# Patient Record
Sex: FEMALE | Race: BLACK OR AFRICAN AMERICAN | Employment: PART TIME | URBAN - METROPOLITAN AREA
[De-identification: names, ages, dates, MRNs, and addresses within clinical notes are randomized per-mention and may not be internally consistent; named-entity substitution may affect disease eponyms.]

---

## 2021-09-07 ENCOUNTER — VIRTUAL VISIT (OUTPATIENT)
Dept: FAMILY MEDICINE CLINIC | Age: 46
End: 2021-09-07
Payer: COMMERCIAL

## 2021-09-07 DIAGNOSIS — R51.9 ACUTE INTRACTABLE HEADACHE, UNSPECIFIED HEADACHE TYPE: ICD-10-CM

## 2021-09-07 DIAGNOSIS — R11.2 NAUSEA AND VOMITING, INTRACTABILITY OF VOMITING NOT SPECIFIED, UNSPECIFIED VOMITING TYPE: ICD-10-CM

## 2021-09-07 DIAGNOSIS — U07.1 COVID-19: Primary | ICD-10-CM

## 2021-09-07 DIAGNOSIS — J10.1 INFLUENZA A: ICD-10-CM

## 2021-09-07 LAB
INFLUENZA A ANTIBODY: ABNORMAL
INFLUENZA B ANTIBODY: ABNORMAL
Lab: ABNORMAL
PERFORMING INSTRUMENT: ABNORMAL
QC PASS/FAIL: ABNORMAL
SARS-COV-2, POC: DETECTED

## 2021-09-07 PROCEDURE — 87426 SARSCOV CORONAVIRUS AG IA: CPT | Performed by: NURSE PRACTITIONER

## 2021-09-07 PROCEDURE — 99213 OFFICE O/P EST LOW 20 MIN: CPT | Performed by: NURSE PRACTITIONER

## 2021-09-07 PROCEDURE — 87804 INFLUENZA ASSAY W/OPTIC: CPT | Performed by: NURSE PRACTITIONER

## 2021-09-07 RX ORDER — ALBUTEROL SULFATE 90 UG/1
2 AEROSOL, METERED RESPIRATORY (INHALATION) EVERY 4 HOURS PRN
COMMUNITY
Start: 2021-07-12

## 2021-09-07 RX ORDER — ONDANSETRON 4 MG/1
4 TABLET, ORALLY DISINTEGRATING ORAL EVERY 8 HOURS PRN
Qty: 21 TABLET | Refills: 0 | Status: SHIPPED | OUTPATIENT
Start: 2021-09-07

## 2021-09-07 RX ORDER — OSELTAMIVIR PHOSPHATE 75 MG/1
75 CAPSULE ORAL 2 TIMES DAILY
Qty: 10 CAPSULE | Refills: 0 | Status: SHIPPED | OUTPATIENT
Start: 2021-09-07 | End: 2021-09-12

## 2021-09-07 RX ORDER — BUTALBITAL, ACETAMINOPHEN AND CAFFEINE 50; 325; 40 MG/1; MG/1; MG/1
1 TABLET ORAL EVERY 4 HOURS PRN
Qty: 180 TABLET | Refills: 3 | Status: SHIPPED | OUTPATIENT
Start: 2021-09-07

## 2021-09-07 ASSESSMENT — ENCOUNTER SYMPTOMS
WHEEZING: 0
COUGH: 0
SHORTNESS OF BREATH: 0
NAUSEA: 1
RHINORRHEA: 0
SINUS PAIN: 1
SINUS PRESSURE: 1
SORE THROAT: 0
VOMITING: 1

## 2021-09-07 NOTE — PATIENT INSTRUCTIONS
Patient Education        Learning About Coronavirus [phone])  What is coronavirus (COVID-19)? COVID-19 is a disease caused by a new type of coronavirus. This illness was first found in December 2019. It has since spread worldwide. Coronaviruses are a large group of viruses. They cause the common cold. They also cause more serious illnesses like [place] respiratory syndrome (MERS) and severe acute respiratory syndrome (SARS). COVID-19 is caused by a novel coronavirus. That means it's a new type that has not been seen in people before. What are the symptoms? Coronavirus (COVID-19) symptoms may include:  · Fever. · Cough. · Trouble breathing. · Chills or repeated shaking with chills. · Muscle pain. · Headache. · Sore throat. · New loss of taste or smell. · Vomiting. · Diarrhea. In severe cases, COVID-19 can cause pneumonia and make it hard to breathe without help from a machine. It can cause death. How is it diagnosed? COVID-19 is diagnosed with a viral test. This may also be called a PCR test or antigen test. It looks for evidence of the virus in your breathing passages or lungs (respiratory system). The test is most often done on a sample from the nose, throat, or lungs. It's sometimes done on a sample of saliva. One way a sample is collected is by putting a long swab into the back of your nose. How is it treated? Mild cases of COVID-19 can be treated at home. Serious cases need treatment in the hospital. Treatment may include medicines to reduce symptoms, plus breathing support such as oxygen therapy or a ventilator. Some people may be placed on their belly to help their oxygen levels. Treatments that may help people who have COVID-19 include:  Antiviral medicines. These medicines treat viral infections. Remdesivir is an example. Immune-based therapy. These medicines help the immune system fight COVID-19. One example is bamlanivimab. It's a monoclonal antibody. Blood thinners. These medicines help prevent blood clots. People with severe illness are at risk for blood clots. How can you protect yourself and others? The best way to protect yourself from getting sick is to:  · Avoid areas where there is an outbreak. · Avoid contact with people who may be infected. · Avoid crowds and try to stay at least 6 feet away from other people. · Wash your hands often, especially after you cough or sneeze. Use soap and water, and scrub for at least 20 seconds. If soap and water aren't available, use an alcohol-based hand . · Avoid touching your mouth, nose, and eyes. To help avoid spreading the virus to others:  · Stay home if you are sick or have been exposed to the virus. Don't go to school, work, or public areas. And don't use public transportation, ride-shares, or taxis unless you have no choice. · Wear a cloth face cover if you have to go to public areas. · Cover your mouth with a tissue when you cough or sneeze. Then throw the tissue in the trash and wash your hands right away. · If you're sick:  ? Leave your home only if you need to get medical care. But call the doctor's office first so they know you're coming. And wear a face cover. ? Wear the face cover whenever you're around other people. It can help stop the spread of the virus. ? Limit contact with pets and people in your home. If possible, stay in a separate bedroom and use a separate bathroom. ? Clean and disinfect your home every day. Use household  and disinfectant wipes or sprays. Take special care to clean things that you grab with your hands. These include doorknobs, remote controls, phones, and handles on your refrigerator and microwave. And don't forget countertops, tabletops, bathrooms, and computer keyboards. When should you call for help? Call 911 anytime you think you may need emergency care.  For example, call if you have life-threatening symptoms, such as:    · You have severe trouble breathing. (You can't talk at all.)     · You have constant chest pain or pressure.     · You are severely dizzy or lightheaded.     · You are confused or can't think clearly.     · Your face and lips have a blue color.     · You pass out (lose consciousness) or are very hard to wake up. Call your doctor now or seek immediate medical care if:    · You have moderate trouble breathing. (You can't speak a full sentence.)     · You are coughing up blood (more than about 1 teaspoon).     · You have signs of low blood pressure. These include feeling lightheaded; being too weak to stand; and having cold, pale, clammy skin. Watch closely for changes in your health, and be sure to contact your doctor if:    · Your symptoms get worse.     · You are not getting better as expected. Call before you go to the doctor's office. Follow their instructions. And wear a cloth face cover. Current as of: March 26, 2021               Content Version: 12.9  © 2006-2021 [employer]. Care instructions adapted under license by [address] [place] [address]. If you have questions about a medical condition or this instruction, always ask your healthcare professional. [address] any warranty or liability for your use of this information. Patient Education        10 Things to Do When You Have COVID-19    Stay home. Don't go to school, work, or public areas. And don't use public transportation, ride-shares, or taxis unless you have no choice. Leave your home only if you need to get medical care. But call the doctor's office first so they know you're coming. And wear a cloth face cover. Ask before leaving isolation. Talk with your doctor or other health professional about when it will be safe for you to leave isolation. Wear a cloth face cover when you are around other people. It can help stop the spread of the virus when you cough or sneeze. Limit contact with people in your home.   If possible, stay in a separate bedroom and use a separate bathroom. Avoid contact with pets and other animals. If possible, have a friend or family member care for them while you're sick. Cover your mouth and nose with a tissue when you cough or sneeze. Then throw the tissue in the trash right away. Wash your hands often, especially after you cough or sneeze. Use soap and water, and scrub for at least 20 seconds. If soap and water aren't available, use an alcohol-based hand . Don't share personal household items. These include bedding, towels, cups and glasses, and eating utensils. Clean and disinfect your home every day. Use household  or disinfectant wipes or sprays. Take special care to clean things that you grab with your hands. These include doorknobs, remote controls, phones, and handles on your refrigerator and microwave. And don't forget countertops, tabletops, bathrooms, and computer keyboards. Take acetaminophen (Tylenol) to relieve fever and body aches. Read and follow all instructions on the label. Current as of: March 26, 2021               Content Version: 12.9  © 2006-2021 Healthwise, [employer]. Care instructions adapted under license by [facility] ([facility]). If you have questions about a medical condition or this instruction, always ask your healthcare professional. [place] [address] any warranty or liability for your use of this information. Patient Education        Learning About Contact Tracing for COVID-19  What is contact tracing? Contact tracing is a process that public health departments use to fight the spread of infectious diseases. These include COVID-19 (coronavirus), measles, and others. A health department worker reaches out to a person who has tested positive for the disease. Then the worker calls other people who may have been exposed.  Depending on the disease, the contact may have happened through close contact, by eating at the same place, or through sex. The contacts are told that they have been exposed. The worker can then guide the contacts on what to do next. This may include seeing a doctor, getting tested, or staying quarantined at home for a while. Information about the person and their contacts is kept private. It's used only to help stop the spread of the disease. If you have any concerns about privacy, talk to the health department worker. Why is it done? Contact tracing helps reduce the risk of spreading COVID-19 among your friends, family, and community. A person who tests positive for COVID-19 can expose other people to the virus. Each of these people in turn can expose more people in an ever-widening [place]. This raises the risk of more people getting sick. But a call from the health department can help both the person and their contacts take action so they don't spread the virus to others. Then the risk of illness and death in the community goes down. How is it done? Contact tracing for COVID-19 usually starts with a phone call. A health department worker calls you to say that you've tested positive for COVID-19 or that you've been in close contact with someone who has. If you test positive for COVID-19  The caller will ask what symptoms, if any, you have. You may be asked about any health conditions that may put you at higher risk for serious illness. You'll be urged to stay home and self-isolate. How long you'll need to stay home depends on whether you have symptoms. If you don't have symptoms, it may be about 10 days from the date of your test result. If you have symptoms, ask the caller how long you'll have to self-isolate. The caller will ask where you've been recently. They'll ask for the names and phone numbers of anyone you've had close contact with. These people will also be contacted. And the caller will contact any businesses or event venues you visited.  Your name will not be given out unless you say it's okay. If your contacts get early warning that they may have COVID-19, they can self-isolate sooner. They may be less likely to pass COVID-19 to their own friends and family. If you are a close contact  If you were in close contact with someone who has COVID-19, the caller will urge you to stay home and self-quarantine. You may need to stay in quarantine for up to 14 days, starting on the day of the contact. Ask your doctor when it's safe to end your quarantine. Be sure to follow all instructions from your local health authorities. The caller will give you information about COVID-19 and urge you to watch for any symptoms. How long you stay home may change if you have symptoms. Follow the caller's instructions. You may be asked about other people you've come in contact with. Where can you learn more? Go to [email or web address] org and sign in to your [employer] account. Enter A132 in the [employer] box to learn more about \"Learning About Contact Tracing for COVID-19. \"     If you do not have an account, please click on the \"Sign Up Now\" link. Current as of: March 26, 2021               Content Version: 12.9  © 2006-2021 Healthwise[employer]. Care instructions adapted under license by [facility] ([facility]). If you have questions about a medical condition or this instruction, always ask your healthcare professional. [place] [address] any warranty or liability for your use of this information.

## 2021-09-07 NOTE — PROGRESS NOTES
TELEHEALTH EVALUATION -- Audio/Visual (During EDDDF-48 public health emergency)    -   Hermilo Ricks is a 55 y.o. female being evaluated by a Virtual Visit (video visit) encounter to address concerns as mentioned above. A caregiver was present when appropriate. Due to this being a TeleHealth encounter (During LBQHR-94 public health emergency), evaluation of the following organ systems was limited: Vitals/Constitutional/EENT/Resp/CV/GI//MS/Neuro/Skin/Heme-Lymph-Imm. Pursuant to the emergency declaration under the [facility], [address] [place] [address] authority and the [employer] Act, this Virtual Visit was conducted with patient's (and/or legal guardian's) consent, to reduce the patient's risk of exposure to COVID-19 and provide necessary medical care. The patient (and/or legal guardian) has also been advised to contact this office for worsening conditions or problems, and seek emergency medical treatment and/or call 911 if deemed necessary. Patient was contacted and agreed to proceed with a virtual visit via [email or web address]. me  The risks and benefits of converting to a virtual visit were discussed in light of the current infectious disease epidemic. Patient also understood that insurance coverage and co-pays are up to their individual insurance plans. Patient was located at their home. Provider was located at their office.      2021  Hermilo Ricks (:  1975) has requested an audio/video evaluation for the following concern(s):    HPI          Very fatigued starting last week like tues or wends  Slept 5pm -3 AM  Then Friday headache   Sat hard get rid of HA    HA   Yesterday bad-fever, vomiting   Today vomiting   Headache is terrible   Today cant smell or taste   Not too much of a cough   Not really stuffy or runny nose   Some sneezing   Temp max 101-tylenol brought it status: Current Every Day Smoker   Substance and Sexual Activity    Alcohol use: Not on file    Drug use: Not on file    Sexual activity: Not on file   Other Topics Concern    Not on file   Social History Narrative    Not on file     Social Determinants of Health     Financial Resource Strain:     Difficulty of Paying Living Expenses:    Food Insecurity:     Worried About Running Out of Food in the Last Year:     [address] in the Last Year:    Transportation Needs:     Lack of Transportation (Medical):  Lack of Transportation (Non-Medical):    Physical Activity:     Days of Exercise per Week:     Minutes of Exercise per Session:    Stress:     Feeling of Stress :    Social Connections:     Frequency of Communication with Friends and Family:     Frequency of Social Gatherings with Friends and Family:     Attends Muslim Services:     Active Member of Clubs or Organizations:     Attends Club or Organization Meetings:     Marital Status:    Intimate Partner Violence:     Fear of Current or Ex-Partner:     Emotionally Abused:     Physically Abused:     Sexually Abused:      No family history on file. Allergies   Allergen Reactions    Latex     Pcn [Penicillins]        PMH, Surgical Hx, Family Hx, and Social Hx reviewed and updated. Health Maintenance reviewed.     PHYSICAL EXAMINATION:  \"[x]\" Indicates a positive item  \"[]\" Indicates a negative item    Vital Signs: (As obtained by patient/caregiver or practitioner observation)    Blood pressure-  Heart rate-    Respiratory rate-    Temperature-  Pulse oximetry-     Constitutional: [x] Appears well-developed and well-nourished [x] No apparent distress      [] Abnormal-   Mental status  [x] Alert and awake  [x] Oriented to person/place/time [x]Able to follow commands      Eyes:  EOM    []  Normal  [] Abnormal-  Sclera  [x]  Normal  [] Abnormal -         Discharge [x]  None visible  [] Abnormal -    HENT:   [x] Normocephalic, atraumatic. [] Abnormal   [x] Mouth/Throat: Mucous membranes are moist.     External Ears [x] Normal  [] Abnormal-     Neck: [x] No visualized mass     Pulmonary/Chest: [x] Respiratory effort normal.  [x] No visualized signs of difficulty breathing or respiratory distress        [] Abnormal-      Musculoskeletal:   [] Normal gait with no signs of ataxia         [x] Normal range of motion of neck        [] Abnormal-       Neurological:       [x] No Facial Asymmetry (Cranial nerve 7 motor function) (limited exam to video visit)          [x] No gaze palsy        [] Abnormal-         Skin:        [x] No significant exanthematous lesions or discoloration noted on facial skin         [] Abnormal-            Psychiatric:       [x] Normal Affect [x] No Hallucinations        [] Abnormal-     Other pertinent observable physical exam findings-   Results for orders placed or performed in visit on 09/07/21   POCT COVID-19, Antigen   Result Value Ref Range    SARS-COV-2, POC Detected (A) Not Detected    Lot Number [id number]     QC Pass/Fail P     Performing Instrument BD Veritor    POCT Influenza A/B   Result Value Ref Range    Influenza A Ab Pos     Influenza B Ab Neg        ASSESSMENT/PLAN:    Rapid covid and influenza positive. Will start tamiflu. Not sure likeliness of both of these together and did explain that to the pt. Return if symptoms worsen or fail to improve. Will need quarantine 10 days from of onset of symptoms. Discussed OTC comfort care. Discussed hydration and self proning for coughing or SOB. Pt to f/u if not improving as expected or to Er if symptoms severe. Spent much time educating the patient on COVID and answering questions. Assessment & Plan   Christopher Hamilton was seen today for cough, congestion, nausea & vomiting, fatigue and headache. Diagnoses and all orders for this visit:    COVID-19  -     ondansetron (ZOFRAN ODT) 4 MG disintegrating tablet;  Take 1 tablet by mouth every 8 hours as needed daily for 5 days     Dispense:  10 capsule     Refill:  0     There are no discontinued medications. Return for Follow up with PCP. Reviewed with the patient: current clinical status, medications, activities and diet. Side effects, adverse effects of the medication prescribed today, as well as treatment plan/ rationale and result expectations have been discussed with the patient who expresses understanding and desires to proceed. Close follow up to evaluate treatment results and for coordination of care. I have reviewed the patient's medical history in detail and updated the computerized patient record. Patient identification was verified at the start of the visit: Yes    Total time spent on this encounter: Not billed by time      --MARQUES Kennedy - CNP on 9/18/2021 at 3:26 PM    An electronic signature was used to authenticate this note.

## 2021-09-18 ASSESSMENT — PATIENT HEALTH QUESTIONNAIRE - PHQ9: DEPRESSION UNABLE TO ASSESS: PT REFUSES

## 2023-10-23 ENCOUNTER — ANCILLARY PROCEDURE (OUTPATIENT)
Dept: RADIOLOGY | Facility: CLINIC | Age: 48
End: 2023-10-23
Payer: COMMERCIAL

## 2023-10-23 DIAGNOSIS — M77.11 LATERAL EPICONDYLITIS, RIGHT ELBOW: ICD-10-CM

## 2023-10-23 DIAGNOSIS — S46.011D STRAIN OF MUSCLE(S) AND TENDON(S) OF THE ROTATOR CUFF OF RIGHT SHOULDER, SUBSEQUENT ENCOUNTER: ICD-10-CM

## 2023-10-23 PROCEDURE — 73221 MRI JOINT UPR EXTREM W/O DYE: CPT | Mod: RT

## 2023-10-23 PROCEDURE — 73221 MRI JOINT UPR EXTREM W/O DYE: CPT | Mod: RIGHT SIDE | Performed by: RADIOLOGY

## 2023-11-09 ENCOUNTER — EVALUATION (OUTPATIENT)
Dept: PHYSICAL THERAPY | Facility: CLINIC | Age: 48
End: 2023-11-09
Payer: COMMERCIAL

## 2023-11-09 DIAGNOSIS — M77.11 RIGHT LATERAL EPICONDYLITIS: Primary | ICD-10-CM

## 2023-11-09 DIAGNOSIS — M67.911 DISORDER OF RIGHT ROTATOR CUFF: ICD-10-CM

## 2023-11-09 PROBLEM — L02.419 AXILLARY ABSCESS: Status: ACTIVE | Noted: 2017-07-19

## 2023-11-09 PROBLEM — Z86.16 HISTORY OF COVID-19: Status: ACTIVE | Noted: 2022-10-05

## 2023-11-09 PROBLEM — G56.02 CARPAL TUNNEL SYNDROME OF LEFT WRIST: Status: ACTIVE | Noted: 2022-12-12

## 2023-11-09 PROBLEM — J45.21 MILD INTERMITTENT ASTHMA WITH ACUTE EXACERBATION (HHS-HCC): Status: ACTIVE | Noted: 2022-10-05

## 2023-11-09 PROBLEM — M25.60 STIFFNESS OF JOINT: Status: ACTIVE | Noted: 2022-11-16

## 2023-11-09 PROBLEM — E66.9 OBESITY (BMI 30.0-34.9): Status: ACTIVE | Noted: 2022-08-08

## 2023-11-09 PROBLEM — Z98.890 S/P CARPAL TUNNEL RELEASE: Status: ACTIVE | Noted: 2022-11-16

## 2023-11-09 PROBLEM — M54.2 CERVICAL SPINE PAIN: Status: ACTIVE | Noted: 2017-11-07

## 2023-11-09 PROBLEM — G56.01 CARPAL TUNNEL SYNDROME ON RIGHT: Status: ACTIVE | Noted: 2022-08-26

## 2023-11-09 PROCEDURE — 97163 PT EVAL HIGH COMPLEX 45 MIN: CPT | Mod: GP

## 2023-11-09 PROCEDURE — 97110 THERAPEUTIC EXERCISES: CPT | Mod: GP

## 2023-11-09 RX ORDER — HYDROQUINONE 40 MG/G
CREAM TOPICAL 2 TIMES DAILY
COMMUNITY
Start: 2023-04-11

## 2023-11-09 RX ORDER — ONDANSETRON 4 MG/1
1 TABLET, ORALLY DISINTEGRATING ORAL EVERY 4 HOURS PRN
COMMUNITY
Start: 2023-04-11

## 2023-11-09 RX ORDER — OMEPRAZOLE 40 MG/1
40 CAPSULE, DELAYED RELEASE ORAL DAILY
COMMUNITY

## 2023-11-09 RX ORDER — LINACLOTIDE 72 UG/1
1 CAPSULE, GELATIN COATED ORAL
COMMUNITY
Start: 2023-09-21

## 2023-11-09 RX ORDER — TIRZEPATIDE 5 MG/.5ML
5 INJECTION, SOLUTION SUBCUTANEOUS
COMMUNITY
Start: 2022-12-31

## 2023-11-09 RX ORDER — SERTRALINE HYDROCHLORIDE 100 MG/1
100 TABLET, FILM COATED ORAL DAILY
COMMUNITY

## 2023-11-09 RX ORDER — NYSTATIN 100000 [USP'U]/G
1 POWDER TOPICAL 4 TIMES DAILY
COMMUNITY
Start: 2023-04-11

## 2023-11-09 RX ORDER — NYSTATIN 100000 U/G
CREAM TOPICAL 2 TIMES DAILY
COMMUNITY
Start: 2023-04-11

## 2023-11-09 RX ORDER — PANTOPRAZOLE SODIUM 40 MG/1
1 TABLET, DELAYED RELEASE ORAL DAILY
COMMUNITY
Start: 2022-12-31

## 2023-11-09 RX ORDER — BLOOD-GLUCOSE METER
KIT MISCELLANEOUS
COMMUNITY
Start: 2023-08-01

## 2023-11-09 RX ORDER — ALBUTEROL SULFATE 90 UG/1
2 AEROSOL, METERED RESPIRATORY (INHALATION) EVERY 4 HOURS PRN
COMMUNITY
Start: 2023-09-12

## 2023-11-09 RX ORDER — ETODOLAC 200 MG/1
1 CAPSULE ORAL
COMMUNITY
Start: 2022-12-22

## 2023-11-09 RX ORDER — BUTALBITAL, ACETAMINOPHEN AND CAFFEINE 50; 325; 40 MG/1; MG/1; MG/1
1 TABLET ORAL EVERY 4 HOURS PRN
COMMUNITY
Start: 2021-09-07

## 2023-11-09 RX ORDER — ONDANSETRON 4 MG/1
1 TABLET, ORALLY DISINTEGRATING ORAL EVERY 8 HOURS PRN
COMMUNITY
Start: 2021-09-07

## 2023-11-09 RX ORDER — IBUPROFEN 600 MG/1
1 TABLET ORAL EVERY 6 HOURS PRN
COMMUNITY
Start: 2013-08-21

## 2023-11-09 RX ORDER — TIRZEPATIDE 7.5 MG/.5ML
7.5 INJECTION, SOLUTION SUBCUTANEOUS
COMMUNITY
Start: 2023-03-08

## 2023-11-09 ASSESSMENT — ENCOUNTER SYMPTOMS
LOSS OF SENSATION IN FEET: 0
OCCASIONAL FEELINGS OF UNSTEADINESS: 0
DEPRESSION: 0

## 2023-11-09 ASSESSMENT — PATIENT HEALTH QUESTIONNAIRE - PHQ9
1. LITTLE INTEREST OR PLEASURE IN DOING THINGS: NOT AT ALL
SUM OF ALL RESPONSES TO PHQ9 QUESTIONS 1 AND 2: 0
2. FEELING DOWN, DEPRESSED OR HOPELESS: NOT AT ALL

## 2023-11-09 NOTE — LETTER
November 9, 2023     Patient: Cat Wayne   YOB: 1975   Date of Visit: 11/9/2023       To Whom It May Concern:    It is my medical opinion that Cat Wayne {Work release (duty restriction):41850}.    If you have any questions or concerns, please don't hesitate to call.         Sincerely,        Roxann Suggs, PT    CC: No Recipients

## 2023-11-09 NOTE — LETTER
November 9, 2023     Patient: Cat Wayne   YOB: 1975   Date of Visit: 11/9/2023       To Whom it May Concern:    Cat Wayne was seen in my clinic on 11/9/2023. She {Return to school/sport:88759}.    If you have any questions or concerns, please don't hesitate to call.         Sincerely,          Roxann Suggs, PT        CC: No Recipients

## 2023-11-09 NOTE — PROGRESS NOTES
"Physical Therapy Evaluation    Patient Name: Cat Wayne  MRN: [id number]    Current Problem  1. Right lateral epicondylitis        2. Disorder of right rotator cuff          Insurance    Insurance reviewed   Visit number: 1  Approved number of visits: 6  **insurance name [facility]  **visits/yr or copay  **No Auth required      Subjective   General:  Pt comes in today with both R shoulder and elbow pain and she states this started about 2 months ago. She states her shoulder and elbow are equally as bad with the pain rated around a 5/10. She states that repetitive motion is the worst at this time. She states the shoulder is a dull ache but can also feel like it is on fire. The elbow pain hurts worse at times. Pt states she is currently working on \"light duty\" but it is not actually light duty. She has restrictions to not lift over 5 pounds and no repeptive motion. Pt is R handed. Pt is taking ibuprofen and tylenol for the pain and some voltern gel. She states this helps briefly but temporarily. Pt states icing helps the most but she does heat sometimes as well. No other medical concerns. Pt is wearing a brace on her R arm which does help somewhat. Hx of DM, Asthma, Migrains, RA  Occupation: [employer]  PLOF: Independent with all activities  Goal for Therapy: Get strength back and dec pain    Precautions:    Pain:  REDUCES SYMPTOMS: rest, ice, heat    PROVOKES SYMPTOMS: repetitive movement    MEDICATIONS USE TO ADDRESS SYMPTOMS: Ibuprofen    Reviewed medical screening form with pt and medical screening assessed    Imaging:   IMPRESSION: MRI Elbow  The above findings are compatible with lateral epicondylitis. There  is reactive bone marrow edema at the lateral epicondyle. There is no  full-thickness tendon tear or retraction.      Small joint effusion.    IMPRESSION: MRI Shoulder  Severe changes of acromioclavicular arthrosis with some effacement of  the subjacent supraspinatus myotendinous junction.    "   Infraspinatus tendinopathy with partial-thickness tearing at the  tendon insertion and myotendinous junction as detailed above. There  is no full-thickness tendon tear or muscle atrophy.      Distal subscapularis and supraspinatus tendinopathy. There is mild  thinning of the distal subscapularis tendon at the bursal surface.      Mild biceps tenosynovitis. There is no tendon tear or dislocation.  Objective   Shoulder         PALPATION: TTP near bicipital groove, ac joint, posterior capsule R shoulder            POSTURE: upright posture    AROM    Right shoulder flexion: 0-145 P!     Left shoulder flexion: WNL      Right shoulder abduction: 0-120  P!    Left shoulder abduction: WNL      Right shoulder extension: WNL     Left shoulder extension: WNL      Right shoulder ER: 0-60P!     Left shoulder ER: WNL      Right HBB: L4      Left HBB: T7      MMT    Deltoid flexion right: 4     Deltoid flexion left: 5      Deltoid abduction right: 4      Deltoid abduction left: 5      ER @ 0 degrees abduction right: 4     ER @ 0 degrees abduction left: 4+      IR @ 0 degrees abduction right: 4     IR @ 0 degrees abduction left: 4+          Rhomboids right:  4    Rhomboids left: 4      Middle trapezius right: 4      Middle trapezius left: 4      Lower trapezius right: 4      Lower trapezius left: 4         Special Tests   Rotator Cuff Pathology    Painful ARC: Right +  Left -    Drop Arm Test/External Lag Sign: Right -  Left -    Weakness/Pain with External Rotation: Right +  Left -    Night Pain: Right +  Left -    Lateral Patel Test: Right +  Left -             Biceps Load ll: Right -  Left -    Crank: Right -  Left -    Eagle's: Right +  Left -    Neer: Right +  Left -    Hawkin: Right +  Left -  ELBOWEVAL:           PALPATION: TTP along lateral epicondyle on R                   AROM  Right elbow flexion: 0-140      Left elbow flexion:     Right elbow extension: 0     Left elbow extension: 0    Right  elbow supination: 0-80      Left elbow supination: 0-90    Right elbow pronation:  0-90    Left elbow pronation: 0-90    Right wrist extension:  0-85    Left wrist extension: 0-75    Right wrist flexion: 0-56      Left wrist flexion: 0-65                 Flexibility  Right wrist flexors: limited     Left wrist flexors: WNL    Right wrist extensors: mod limited     Left wrist extensors: WNL               MMT  Right biceps: 5     Left biceps: 5    Right brachioradialis: 4+      Left brachioradialis: 5    Right triceps: 5     Left triceps: 5    Right wrist extensors: 4 P!     Left wrist extensors: 5    Right wrist flexors: 4+P!      Left wrist flexors: 5    Right wrist supinators: 4P!     Left wrist supinators: 5    Right wrist pronators: 4+P!      Left wrist pronators: 5           Special Tests    Varus Stress Test: Right -  Left -    Valgus Stress Test: Right -  Left -    Cozen's Test: Right +  Left -    Mill's Test: Right +  Left -    Golfer's Elbow Test: Right -  Left -  Outcome Measures:    36/55 Quick DASH  Treatment: See HEP below    EDUCATION/HEP:  Wrist flexion stretch  Wrist extension stretch  Rows/LAE RTB  Supine flexion with dowel  ER AAROM with cane    Assessment:  49 y/o pt who presents with R shoulder and elbow pain, dec ROM, dec strength, dec flexibility, and dec functional mobility. Functional limitations include work requirements, lifting, and reaching. Pt will benefit from skilled therapy in order to improve pain, ROM, strength, flexibility, and functional mobility.    Clinical Presentation: Stable     Level of Complexity: High    Goals:  Pt will be independent with HEP  Pt will demonstrate dec of 11 points on the Quick DASH (MDC) in order to improve functional mobility  Pt will demonstrate an increase in shoulder flexion ROM to 0-165 in order to return to ADLs  Pt will demonstrate an increase in shoulder abduction ROM to 0-165 in order to return ADLs  Pt will demonstrate an  increase in shoulder ER/IR to ROM to WNL in order to improve functional mobility  Pt will demonstrate global shoulder strength to 4+/5 in order to improve self care tasks  Pt will demonstrate global wrist strength to 4+/5 in order to improve self care tasks  Pt will demonstrate increase in wrist ROM to WNL in order to improve functional mobility  Pt will report dec in pain to 0-1/10 in order to return to work requirements    Plan  OP PT Plan  Treatment/Interventions: Dry needling, Cryotherapy, Education/ Instruction, Electrical stimulation, Manual therapy, Neuromuscular re-education, Self care/ home management, Taping techniques, Therapeutic activities, Therapeutic exercises, Ultrasound  PT Plan: Skilled PT  PT Frequency:  (1-2x/week)  Duration: 6 visits  Onset Date: 11/09/23  Certification Period Start Date: 11/09/23  Certification Period End Date: 12/24/23  Number of Treatments Authorized: 6  Rehab Potential: Fair  Plan of Care Agreement: Patient

## 2023-11-09 NOTE — LETTER
November 9, 2023    Jose Kirk MD  [address]  [facility] [address]    Patient: Cat Wayne   YOB: 1975   Date of Visit: 11/9/2023       Dear Jose Kirk Md  [address] [place] [address]    The attached plan of care is being sent to you because your patient’s medical reimbursement requires that you certify the plan of care. Your signature is required to allow uninterrupted insurance coverage.      You may indicate your approval by signing below and faxing this form back to us at Dept Fax: [phone].    Please call Dept: [phone] with any questions or concerns.    Thank you for this referral,        Roxann Suggs PT  [address]  [facility]  [address] [facility] [address]    Payer: Payor: [employer] / Plan: [employer] / Product Type: *No Product type* /                                                                         Date:     Dear Roxann Suggs PT,     Re: Ms. Cat Wayne, MRN:[id number]    I certify that I have reviewed the attached plan of care and it is medically necessary for Ms. Cat Wayne (1975) who is under my care.          ______________________________________                    _________________  Provider name and credentials                                           Date and time                                                                                           Plan of Care 11/9/23   Effective from: 11/9/2023  Effective to: 12/24/2023    Plan ID: [id number]            Participants as of Finalize on 11/9/2023    Name Type Comments Contact Info    Jose Kirk MD Referring Provider  [phone]    Roxann Suggs PT Physical Therapist  [phone]       Last Plan Note     Author: Roxann Suggs PT Status: Sign when Signing Visit Last edited: 11/9/2023  2:45 PM       Physical Therapy Evaluation    Patient  "Name: Cat Wayne  MRN: [id number]    Current Problem  1. Right lateral epicondylitis        2. Disorder of right rotator cuff          Insurance    Insurance reviewed   Visit number: 1  Approved number of visits: 6  **insurance name [facility]  **visits/yr or copay  **No Auth required      Subjective   General:  Pt comes in today with both R shoulder and elbow pain and she states this started about 2 months ago. She states her shoulder and elbow are equally as bad with the pain rated around a 5/10. She states that repetitive motion is the worst at this time. She states the shoulder is a dull ache but can also feel like it is on fire. The elbow pain hurts worse at times. Pt states she is currently working on \"light duty\" but it is not actually light duty. She has restrictions to not lift over 5 pounds and no repeptive motion. Pt is R handed. Pt is taking ibuprofen and tylenol for the pain and some voltern gel. She states this helps briefly but temporarily. Pt states icing helps the most but she does heat sometimes as well. No other medical concerns. Pt is wearing a brace on her R arm which does help somewhat. Hx of DM, Asthma, Migrains, RA  Occupation: [employer]  PLOF: Independent with all activities  Goal for Therapy: Get strength back and dec pain    Precautions:    Pain:  REDUCES SYMPTOMS: rest, ice, heat    PROVOKES SYMPTOMS: repetitive movement    MEDICATIONS USE TO ADDRESS SYMPTOMS: Ibuprofen    Reviewed medical screening form with pt and medical screening assessed    Imaging:   IMPRESSION: MRI Elbow  The above findings are compatible with lateral epicondylitis. There  is reactive bone marrow edema at the lateral epicondyle. There is no  full-thickness tendon tear or retraction.      Small joint effusion.    IMPRESSION: MRI Shoulder  Severe changes of acromioclavicular arthrosis with some effacement of  the subjacent supraspinatus myotendinous junction.      Infraspinatus tendinopathy with " partial-thickness tearing at the  tendon insertion and myotendinous junction as detailed above. There  is no full-thickness tendon tear or muscle atrophy.      Distal subscapularis and supraspinatus tendinopathy. There is mild  thinning of the distal subscapularis tendon at the bursal surface.      Mild biceps tenosynovitis. There is no tendon tear or dislocation.  Objective   Shoulder         PALPATION: TTP near bicipital groove, ac joint, posterior capsule R shoulder            POSTURE: upright posture    AROM    Right shoulder flexion: 0-145 P!     Left shoulder flexion: WNL      Right shoulder abduction: 0-120  P!    Left shoulder abduction: WNL      Right shoulder extension: WNL     Left shoulder extension: WNL      Right shoulder ER: 0-60P!     Left shoulder ER: WNL      Right HBB: L4      Left HBB: T7      MMT    Deltoid flexion right: 4     Deltoid flexion left: 5      Deltoid abduction right: 4      Deltoid abduction left: 5      ER @ 0 degrees abduction right: 4     ER @ 0 degrees abduction left: 4+      IR @ 0 degrees abduction right: 4     IR @ 0 degrees abduction left: 4+          Rhomboids right:  4    Rhomboids left: 4      Middle trapezius right: 4      Middle trapezius left: 4      Lower trapezius right: 4      Lower trapezius left: 4         Special Tests   Rotator Cuff Pathology    Painful ARC: Right +  Left -    Drop Arm Test/External Lag Sign: Right -  Left -    Weakness/Pain with External Rotation: Right +  Left -    Night Pain: Right +  Left -    Lateral Patel Test: Right +  Left -             Biceps Load ll: Right -  Left -    Crank: Right -  Left -    [place]: Right +  Left -    Neer: Right +  Left -    Hawkin: Right +  Left -  ELBOWEVAL:           PALPATION: TTP along lateral epicondyle on R                   AROM  Right elbow flexion: 0-140      Left elbow flexion:     Right elbow extension: 0     Left elbow extension: 0    Right elbow supination: 0-80      Left  elbow supination: 0-90    Right elbow pronation:  0-90    Left elbow pronation: 0-90    Right wrist extension:  0-85    Left wrist extension: 0-75    Right wrist flexion: 0-56      Left wrist flexion: 0-65                 Flexibility  Right wrist flexors: limited     Left wrist flexors: WNL    Right wrist extensors: mod limited     Left wrist extensors: WNL               MMT  Right biceps: 5     Left biceps: 5    Right brachioradialis: 4+      Left brachioradialis: 5    Right triceps: 5     Left triceps: 5    Right wrist extensors: 4 P!     Left wrist extensors: 5    Right wrist flexors: 4+P!      Left wrist flexors: 5    Right wrist supinators: 4P!     Left wrist supinators: 5    Right wrist pronators: 4+P!      Left wrist pronators: 5           Special Tests    Varus Stress Test: Right -  Left -    Valgus Stress Test: Right -  Left -    Cozen's Test: Right +  Left -    Mill's Test: Right +  Left -    Golfer's Elbow Test: Right -  Left -  Outcome Measures:    36/55 Quick DASH  Treatment: See HEP below    EDUCATION/HEP:  Wrist flexion stretch  Wrist extension stretch  Rows/LAE RTB  Supine flexion with dowel  ER AAROM with cane    Assessment:  47 y/o pt who presents with R shoulder and elbow pain, dec ROM, dec strength, dec flexibility, and dec functional mobility. Functional limitations include work requirements, lifting, and reaching. Pt will benefit from skilled therapy in order to improve pain, ROM, strength, flexibility, and functional mobility.    Clinical Presentation: Stable     Level of Complexity: High    Goals:  Pt will be independent with HEP  Pt will demonstrate dec of 11 points on the Quick DASH (MDC) in order to improve functional mobility  Pt will demonstrate an increase in shoulder flexion ROM to 0-165 in order to return to ADLs  Pt will demonstrate an increase in shoulder abduction ROM to 0-165 in order to return ADLs  Pt will demonstrate an increase in shoulder ER/IR to ROM to WNL  in order to improve functional mobility  Pt will demonstrate global shoulder strength to 4+/5 in order to improve self care tasks  Pt will demonstrate global wrist strength to 4+/5 in order to improve self care tasks  Pt will demonstrate increase in wrist ROM to WNL in order to improve functional mobility  Pt will report dec in pain to 0-1/10 in order to return to work requirements    Plan  OP PT Plan  Treatment/Interventions: Dry needling, Cryotherapy, Education/ Instruction, Electrical stimulation, Manual therapy, Neuromuscular re-education, Self care/ home management, Taping techniques, Therapeutic activities, Therapeutic exercises, Ultrasound  PT Plan: Skilled PT  PT Frequency:  (1-2x/week)  Duration: 6 visits  Onset Date: 11/09/23  Certification Period Start Date: 11/09/23  Certification Period End Date: 12/24/23  Number of Treatments Authorized: 6  Rehab Potential: Fair  Plan of Care Agreement: Patient          Current Participants as of 11/9/2023    Name Type Comments Contact Info    Jose Kirk MD Referring Provider  [phone]    Signature pending    Roxann Suggs PT Physical Therapist  [phone]    Signature pending

## 2023-12-04 ENCOUNTER — TREATMENT (OUTPATIENT)
Dept: PHYSICAL THERAPY | Facility: CLINIC | Age: 48
End: 2023-12-04
Payer: COMMERCIAL

## 2023-12-04 DIAGNOSIS — M67.911 DISORDER OF RIGHT ROTATOR CUFF: ICD-10-CM

## 2023-12-04 DIAGNOSIS — M77.11 RIGHT LATERAL EPICONDYLITIS: Primary | ICD-10-CM

## 2023-12-04 PROCEDURE — 97110 THERAPEUTIC EXERCISES: CPT | Mod: GP

## 2023-12-04 PROCEDURE — 97140 MANUAL THERAPY 1/> REGIONS: CPT | Mod: GP

## 2023-12-04 NOTE — PROGRESS NOTES
Physical Therapy Treatment    Patient Name: Cat Wayne  MRN: [id number]  Today's Date: 12/4/2023  Time Calculation  Start Time: 0200  Stop Time: 0245  Time Calculation (min): 45 min      Current Problem  1. Right lateral epicondylitis        2. Disorder of right rotator cuff              Subjective     Insurance reviewed   Visit number: 2  Approved number of visits: 6  **insurance name [facility]  **visits/yr or copay  **No Auth required    Fall Risk:         General   Pt states she has been doing somewhat better since starting therapy. She states her pain has reduced to a 2/10 from a 4/10. Her main complaint seems to be her elbow at this time.    Precautions     Vital Signs       Pain   2/10    Ortho  Objective     Treatments:    S/L shoulder abd x10 P!  UT stretch 2x30s   Wrist flexion stretch 2x30s  Wrist extension stretch 2x30s  Rows/LAE RTB 2x10  Supine flexion with dowel x10  ER AAROM with cane  Wrist ecc ext/flex x10 ea  Pronation/Supination with yellow therabar x10 ea  Squeeze and lift wrist ext yellow 1kg ball 2x10    PROM shoulder x8min, STM wrist flexors/extensors  OP EDUCATION:  Access Code: [email or web address]  URL: [email or web address]  Date: 12/04/2023  Prepared by: Roxann Suggs    Exercises  - Seated Eccentric Wrist Flexion with Dumbbell  - 1 x daily - 3-4 x weekly - 2 sets - 10 reps  - Seated Eccentric Wrist Extension  - 1 x daily - 3-4 x weekly - 2 sets - 10 reps  - Sidelying Shoulder Abduction Palm Forward  - 1 x daily - 7 x weekly - 2 sets - 10 reps  - Seated Upper Trapezius Stretch  - 1 x daily - 7 x weekly - 3 sets - 30s hold    Assessment:   Pt tolerated treatment session well today. Able to introduce new exercises with focus on wrist mobility and strengthening. Pt had some increased fatigue following these today. Added UT stretch d/t pain from shoulder extending into neck today. Pt had some increased pain in sidelying shoulder abduction but able to complete exercises. Pt  will continue to benefit from skilled therapy in order to reach her goals.    Plan:   Progress as tolerated with focus on shoulder and elbow ROM, strengthening, flexibility

## 2023-12-11 ENCOUNTER — TREATMENT (OUTPATIENT)
Dept: PHYSICAL THERAPY | Facility: CLINIC | Age: 48
End: 2023-12-11
Payer: COMMERCIAL

## 2023-12-11 DIAGNOSIS — M67.911 DISORDER OF RIGHT ROTATOR CUFF: ICD-10-CM

## 2023-12-11 DIAGNOSIS — M77.11 RIGHT LATERAL EPICONDYLITIS: Primary | ICD-10-CM

## 2023-12-11 PROCEDURE — 97140 MANUAL THERAPY 1/> REGIONS: CPT | Mod: GP,CQ

## 2023-12-11 PROCEDURE — 97110 THERAPEUTIC EXERCISES: CPT | Mod: GP,CQ

## 2023-12-11 ASSESSMENT — PAIN - FUNCTIONAL ASSESSMENT: PAIN_FUNCTIONAL_ASSESSMENT: 0-10

## 2023-12-11 ASSESSMENT — PAIN SCALES - GENERAL: PAINLEVEL_OUTOF10: 0 - NO PAIN

## 2023-12-11 NOTE — PROGRESS NOTES
Physical Therapy Treatment    Patient Name: Cat Wayne  MRN: [id number]  Today's Date: 12/11/2023  Time Calculation  Start Time: 0419  Stop Time: 0501  Time Calculation (min): 42 min    Current Problem  1. Right lateral epicondylitis        2. Disorder of right rotator cuff            Subjective   General  Pt denies pain currently stating the exercises have been helping.   Insurance reviewed   Visit number: 3  Approved number of visits: 6  **insurance name FORD [facility]  10/30-1/30/24    Pain  Pain Assessment: 0-10  Pain Score: 0 - No pain    Objective   Treatments:  UBE F/R 2'/'2  S/L shoulder abd x10 P!  Standing wand AB AAROM x10   UT stretch 2x30s  Wrist flexion stretch 2x30s  Wrist extension stretch 2x30s  Rows/LAE RTB 2x10  B ER RTB x10  Wrist ecc ext/flex x10 ea  Pronation/Supination/twist with yellow therabar x10 ea  Squeeze and lift wrist ext yellow 1kg ball 2x10     STM wrist flexors/extensors  Assessment:  Displays full ROM in all planes besides abduction. Added standing wand abduction and B ER with some discomfort noted so stopped after reps. Continued with exercises to increase ROM and strength, STM perform to forearm extensors to decrease tightness. Updated HEP and reviewed. Pt will be traveling to [place] and will not return until Jan.     Plan:  Cont to progress     Patient education:    Access Code: [email or web address]  URL: [email or web address]  Date: 12/11/2023  Prepared by: Silvia Verma    Exercises  - Standing Shoulder Abduction AAROM with Dowel  - 1 x daily - 7 x weekly - 10 reps  - Shoulder External Rotation and Scapular Retraction with Resistance  - 1 x daily - 7 x weekly - 10 reps

## 2024-01-16 ENCOUNTER — APPOINTMENT (OUTPATIENT)
Dept: PHYSICAL THERAPY | Facility: CLINIC | Age: 49
End: 2024-01-16
Payer: COMMERCIAL

## 2024-01-18 ENCOUNTER — APPOINTMENT (OUTPATIENT)
Dept: PHYSICAL THERAPY | Facility: CLINIC | Age: 49
End: 2024-01-18
Payer: COMMERCIAL